# Patient Record
Sex: FEMALE | Race: BLACK OR AFRICAN AMERICAN | NOT HISPANIC OR LATINO | URBAN - METROPOLITAN AREA
[De-identification: names, ages, dates, MRNs, and addresses within clinical notes are randomized per-mention and may not be internally consistent; named-entity substitution may affect disease eponyms.]

---

## 2019-06-06 ENCOUNTER — EMERGENCY (EMERGENCY)
Facility: HOSPITAL | Age: 64
LOS: 1 days | End: 2019-06-06
Admitting: EMERGENCY MEDICINE
Payer: COMMERCIAL

## 2019-06-06 PROCEDURE — 71046 X-RAY EXAM CHEST 2 VIEWS: CPT | Mod: 26

## 2019-06-06 PROCEDURE — 71275 CT ANGIOGRAPHY CHEST: CPT | Mod: 26

## 2019-06-06 PROCEDURE — 93010 ELECTROCARDIOGRAM REPORT: CPT

## 2019-06-06 PROCEDURE — 99285 EMERGENCY DEPT VISIT HI MDM: CPT | Mod: 25

## 2020-06-29 PROBLEM — Z00.00 ENCOUNTER FOR PREVENTIVE HEALTH EXAMINATION: Status: ACTIVE | Noted: 2020-06-29

## 2020-07-14 ENCOUNTER — APPOINTMENT (OUTPATIENT)
Dept: POPULATION HEALTH | Facility: CLINIC | Age: 65
End: 2020-07-14
Payer: COMMERCIAL

## 2020-07-14 VITALS
RESPIRATION RATE: 16 BRPM | HEIGHT: 68 IN | BODY MASS INDEX: 28.95 KG/M2 | DIASTOLIC BLOOD PRESSURE: 70 MMHG | SYSTOLIC BLOOD PRESSURE: 120 MMHG | TEMPERATURE: 98.4 F | WEIGHT: 191 LBS | HEART RATE: 84 BPM

## 2020-07-14 DIAGNOSIS — Z87.891 PERSONAL HISTORY OF NICOTINE DEPENDENCE: ICD-10-CM

## 2020-07-14 DIAGNOSIS — L29.9 PRURITUS, UNSPECIFIED: ICD-10-CM

## 2020-07-14 DIAGNOSIS — Z80.42 FAMILY HISTORY OF MALIGNANT NEOPLASM OF PROSTATE: ICD-10-CM

## 2020-07-14 DIAGNOSIS — Z81.1 FAMILY HISTORY OF ALCOHOL ABUSE AND DEPENDENCE: ICD-10-CM

## 2020-07-14 DIAGNOSIS — R60.0 LOCALIZED EDEMA: ICD-10-CM

## 2020-07-14 DIAGNOSIS — Z77.098 CONTACT WITH AND (SUSPECTED) EXPOSURE TO OTHER HAZARDOUS, CHIEFLY NONMEDICINAL, CHEMICALS: ICD-10-CM

## 2020-07-14 DIAGNOSIS — Z84.89 FAMILY HISTORY OF OTHER SPECIFIED CONDITIONS: ICD-10-CM

## 2020-07-14 PROCEDURE — 99203 OFFICE O/P NEW LOW 30 MIN: CPT

## 2020-07-15 PROBLEM — Z87.891 FORMER SMOKER: Status: ACTIVE | Noted: 2020-07-15

## 2020-07-15 PROBLEM — L29.9 CHRONIC PRURITUS: Status: ACTIVE | Noted: 2020-07-15

## 2020-07-15 PROBLEM — Z84.89 FAMILY HISTORY OF NEOPLASM OF BRAIN: Status: ACTIVE | Noted: 2020-07-15

## 2020-07-15 PROBLEM — Z80.42 FAMILY HISTORY OF MALIGNANT NEOPLASM OF PROSTATE: Status: ACTIVE | Noted: 2020-07-15

## 2020-07-15 PROBLEM — Z77.098 EXPOSURE TO CHEMICAL POLLUTION: Status: ACTIVE | Noted: 2020-07-15

## 2020-07-15 PROBLEM — Z81.1 FAMILY HISTORY OF ALCOHOLISM: Status: ACTIVE | Noted: 2020-07-15

## 2020-07-15 PROBLEM — R60.0 EDEMA OF FOOT: Status: ACTIVE | Noted: 2020-07-15

## 2020-07-15 NOTE — HISTORY OF PRESENT ILLNESS
[FreeTextEntry1] : 66 yo woman here for evaluation of exposure to contaminated water in [facility].\par \par Pt was born in [place] and has lived there her entire life, with the exception of  service from [id number].\par \par Her house in [place] was supplied by well water untl the , when it was closed due to contamination and switched to [facility] water supply.  She recalls having blood tests done by the  at that time, but does not know what they were or what the results were.  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  She had a few episodes during her  time and in  had severe itching that ended up with an allergy workup done by and immunologist that yeilded no diagnosis.  it was most sever at night, and is relieved now by taking antihistamine at night.\par also c/o swelling in the feet (not legs, only feet and toes) over the last few years of working that led to her care home.  no diagnosis noted.\par \par \par FHX:\par brother - hves\par sister -  of brain tumor at age 9\par brother - prostate cancer\par 7 other siblins are healthy\par mother - mild CVA\par father - alcoholism\par 1 son -  of meningitis at age 5 months\par 1 son - diziness and skin rashes\par 2 daughters - both healthy\par \par

## 2020-07-15 NOTE — REVIEW OF SYSTEMS
[Joint Swelling] : joint swelling [Joint Pain] : joint pain [see HPI] : see HPI [Itching] : itching [Anxiety] : anxiety [Depression] : depression [Negative] : Endocrine

## 2020-07-15 NOTE — PHYSICAL EXAM
[General Appearance - Alert] : alert [General Appearance - In No Acute Distress] : in no acute distress [Abnormal Walk] : normal gait [Skin Color & Pigmentation] : normal skin color and pigmentation [Oriented To Time, Place, And Person] : oriented to person, place, and time [Affect] : the affect was normal [Impaired Insight] : insight and judgment were intact [Mood] : the mood was normal [FreeTextEntry1] : b

## 2020-07-15 NOTE — ASSESSMENT
[FreeTextEntry1] : Ms. Morfin is a 65 year old woman with a history of pruritis and edema who has had exposure to PFAS chemicals while living and working in [facility].  \par \par PFAS exposure has been established to result in increased risk of the following health effects:\par ~ Hepatocellular injury \par ~ Alterations in cholesterol metabolism \par ~ Alterations in thyroid function \par ~ Cancers-- with the kidney and the testes being best studied but evidence existing for other cancers, as well.\par ~ Alterations in androgen levels \par ~ Alterations of uric acid \par ~ Fertility and gestational issues \par ~ Alterations in antibody levels\par \par \par PFAS exposure, and its health effects, are further compounded by the fact that PFHxS has a half-life in the body is between five to 36 years, PFOS three to 27 years, while PFOA has a half-life of two to 10 years.\par \par \par Given the long half-life she is still at risk for the non-cancer outcomes and the elevated risk of related cancers will remain throughout her life.\par \par \par I had extensive discussion with pt about all of the above. All questions answered. I advised her that vigilance about her health, rather than anxiety or panic, was warranted. \par \par \par She understood and agreed with the plan. \par RTC 1 y unless clinical or exposure hx changes.\par \par \par

## 2023-06-20 ENCOUNTER — APPOINTMENT (OUTPATIENT)
Dept: MRI IMAGING | Facility: CLINIC | Age: 68
End: 2023-06-20
Payer: MEDICARE

## 2023-06-20 PROCEDURE — 77049 MRI BREAST C-+ W/CAD BI: CPT

## 2023-06-20 PROCEDURE — A9585: CPT

## 2023-06-20 PROCEDURE — A9579: CPT

## 2025-08-25 ENCOUNTER — RESULT REVIEW (OUTPATIENT)
Age: 70
End: 2025-08-25

## 2025-09-03 DIAGNOSIS — Z91.89 OTHER SPECIFIED PERSONAL RISK FACTORS, NOT ELSEWHERE CLASSIFIED: ICD-10-CM
